# Patient Record
Sex: MALE
[De-identification: names, ages, dates, MRNs, and addresses within clinical notes are randomized per-mention and may not be internally consistent; named-entity substitution may affect disease eponyms.]

---

## 2017-04-22 PROBLEM — Z00.00 ENCOUNTER FOR PREVENTIVE HEALTH EXAMINATION: Status: ACTIVE | Noted: 2017-04-22

## 2022-05-26 ENCOUNTER — APPOINTMENT (OUTPATIENT)
Dept: UROLOGY | Facility: CLINIC | Age: 72
End: 2022-05-26
Payer: MEDICARE

## 2022-05-26 VITALS
HEART RATE: 84 BPM | OXYGEN SATURATION: 98 % | WEIGHT: 180 LBS | DIASTOLIC BLOOD PRESSURE: 85 MMHG | HEIGHT: 69 IN | BODY MASS INDEX: 26.66 KG/M2 | TEMPERATURE: 97 F | SYSTOLIC BLOOD PRESSURE: 161 MMHG

## 2022-05-26 DIAGNOSIS — Z86.39 PERSONAL HISTORY OF OTHER ENDOCRINE, NUTRITIONAL AND METABOLIC DISEASE: ICD-10-CM

## 2022-05-26 DIAGNOSIS — Z85.46 PERSONAL HISTORY OF MALIGNANT NEOPLASM OF PROSTATE: ICD-10-CM

## 2022-05-26 PROCEDURE — 99205 OFFICE O/P NEW HI 60 MIN: CPT

## 2022-05-26 RX ORDER — ATORVASTATIN CALCIUM 80 MG/1
TABLET, FILM COATED ORAL
Refills: 0 | Status: ACTIVE | COMMUNITY

## 2022-05-26 RX ORDER — METFORMIN HYDROCHLORIDE 625 MG/1
TABLET ORAL
Refills: 0 | Status: ACTIVE | COMMUNITY

## 2022-05-26 RX ORDER — LISINOPRIL 10 MG/1
10 TABLET ORAL
Refills: 0 | Status: ACTIVE | COMMUNITY

## 2022-05-26 NOTE — ASSESSMENT
[FreeTextEntry1] : Diagnosis: Prostate Cancer\par \par Plan:\par Prostate MRI then follow up after for discussion of surgery\par Referral to Dr. Jones to discuss umbilical hernia repair same time as radical prostatectomy\par \par Today we discussed the natural history of localized prostate cancer, and the heterogeneous biology of this malignancy. We discussed the fact that many prostate cancers are slow growing and unlikely to metastasize or cause death, whereas others can be life threatening. We reviewed risk stratification, staging, [place] scoring, and PSA levels as they pertain to risk. \par \par All treatment options were reviewed. This included active surveillance, androgen deprivation, emerging options such as focal therapy/HIFU/cryotherapy, radiation options (including IMRT, SBRT, brachytherapy) and surgical options (open vs. robotic surgery, nerve vs. non-nerve sparing). Oncologic outcomes were compared and contrasted. Risks of biochemical and clinical recurrence discussed. Risks of needing adjuvant or salvage treatments reviewed. We discussed the risks of secondary malignancy after radiation. We discussed the opportunity for pathological staging with surgery vs. other options. We discussed the possibility of positive margins, treatment failure, cancer recurrence and cancer-related death even with treatment. \par \par All potential side effects of treatment were reviewed including, but not limited to: short term or permanent stress urinary incontinence and/or short term or permanent erectile dysfunction, penile shortening, rectal symptoms/pain, perineal pain, and other side effects. \par \par All potential complications of treatment and surgery were reviewed including, but not limited to: risks of conversion from MIS to open surgery discussed, bleeding//life-threatening hemorrhage, rectal injury requiring colostomy or delayed recognition leading to fistula, vascular/bowel/adjacent visceral organ injury, trocar/access injury, the possibility of recognized vs. unrecognized/delayed-recognition injury, risks of thermal/blunt/sharp/retraction injury, risks of DVT, PE, MI, death, risks of cardiopulmonary/anesthesia related complications, positional injury, infection/collection/abscess, wound complications/dehiscence/seroma/cellulitis, urinoma/fistula, ureteral injury/obstruction, bladder neck contracture, penile shortening, meatal stenosis, urethral stricture, lymphocele, obturator nerve injury, prolonged anastomotic leak, and other complications. \par \par Plan: \par I will have him see GS for hernia concurrent with prostatectomy as he has elected surgical approach for cancer. \par \par Kirt Noel MD, FRCS \par  of Urology [facility]\par Director of Laparoscopic and Robotic Surgery \par [facility] Director of Urology, [facility] \par Professor of Urology\par \par (Office) [phone]\par (Cell)  [phone] \par [email or web address]\par \par \par \par \par

## 2022-05-26 NOTE — PHYSICAL EXAM
[General Appearance - Well Developed] : well developed [General Appearance - Well Nourished] : well nourished [Normal Appearance] : normal appearance [Well Groomed] : well groomed [General Appearance - In No Acute Distress] : no acute distress [Edema] : no peripheral edema [Respiration, Rhythm And Depth] : normal respiratory rhythm and effort [Exaggerated Use Of Accessory Muscles For Inspiration] : no accessory muscle use [Abdomen Soft] : soft [Abdomen Tenderness] : non-tender [Costovertebral Angle Tenderness] : no ~M costovertebral angle tenderness [Urethral Meatus] : meatus normal [Penis Abnormality] : normal circumcised penis [Urinary Bladder Findings] : the bladder was normal on palpation [Scrotum] : the scrotum was normal [Testes Mass (___cm)] : there were no testicular masses [Prostate Tenderness] : the prostate was not tender [No Prostate Nodules] : no prostate nodules [Prostate Size ___ gm] : prostate size [unfilled] gm [Normal Station and Gait] : the gait and station were normal for the patient's age [] : no rash [No Focal Deficits] : no focal deficits [Oriented To Time, Place, And Person] : oriented to person, place, and time [Affect] : the affect was normal [Mood] : the mood was normal [Not Anxious] : not anxious [No Palpable Adenopathy] : no palpable adenopathy [FreeTextEntry1] : umbilical hernia

## 2022-05-26 NOTE — HISTORY OF PRESENT ILLNESS
[FreeTextEntry1] : Dr. Mir Robert\par [address] [address]\par \par \par This is a 72 year old male who presents today for elevated PSA.  He has pMHx significant for HTN, HLD, Diabetes (A1c 5.9).\par \par PSA 11.52\par \par He underwent prostate biopsy on 4/29/22 with Dr. Leonidas Carpenter.  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

## 2022-06-17 ENCOUNTER — APPOINTMENT (OUTPATIENT)
Dept: SURGERY | Facility: CLINIC | Age: 72
End: 2022-06-17

## 2022-06-17 VITALS
DIASTOLIC BLOOD PRESSURE: 94 MMHG | OXYGEN SATURATION: 97 % | BODY MASS INDEX: 26.51 KG/M2 | TEMPERATURE: 97.3 F | WEIGHT: 179 LBS | HEIGHT: 69 IN | SYSTOLIC BLOOD PRESSURE: 154 MMHG | HEART RATE: 86 BPM

## 2022-06-17 DIAGNOSIS — E78.00 PURE HYPERCHOLESTEROLEMIA, UNSPECIFIED: ICD-10-CM

## 2022-06-17 DIAGNOSIS — I10 ESSENTIAL (PRIMARY) HYPERTENSION: ICD-10-CM

## 2022-06-17 DIAGNOSIS — K42.9 UMBILICAL HERNIA W/OUT OBSTRUCTION OR GANGRENE: ICD-10-CM

## 2022-06-17 PROCEDURE — 99203 OFFICE O/P NEW LOW 30 MIN: CPT

## 2022-06-21 ENCOUNTER — APPOINTMENT (OUTPATIENT)
Dept: UROLOGY | Facility: CLINIC | Age: 72
End: 2022-06-21
Payer: MEDICARE

## 2022-06-21 VITALS — SYSTOLIC BLOOD PRESSURE: 163 MMHG | DIASTOLIC BLOOD PRESSURE: 88 MMHG | HEART RATE: 88 BPM | TEMPERATURE: 98 F

## 2022-06-21 PROCEDURE — 99215 OFFICE O/P EST HI 40 MIN: CPT

## 2022-06-21 NOTE — HISTORY OF PRESENT ILLNESS
[FreeTextEntry1] : Dr. 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

## 2022-06-21 NOTE — ASSESSMENT
[FreeTextEntry1] : Diagnosis: prostate cancer and umbilical hernia\par \par PLAN\par \par Today we discussed the natural history of localized prostate cancer, and the heterogeneous biology of this malignancy.  We discussed the fact that many prostate cancers are slow growing and unlikely to metastasize or cause death, whereas others can be life threatening.  We reviewed risk stratification, staging, [place] scoring, and PSA levels as they pertain to risk.  \par \par All treatment options were reviewed.  This included active surveillance, androgen deprivation, emerging options such as focal therapy/HIFU/cryotherapy, radiation options (including IMRT, SBRT, brachytherapy) and surgical options (open vs. robotic surgery, nerve vs. non-nerve sparing).  Oncologic outcomes were compared and contrasted.  Risks of biochemical and clinical recurrence discussed.  Risks of needing adjuvant or salvage treatments reviewed.  We discussed the risks of secondary malignancy after radiation.  We discussed the opportunity for pathological staging with surgery vs. other options.  We discussed the possibility of positive margins, treatment failure, cancer recurrence and cancer-related death even with treatment. \par \par All potential side effects of treatment were reviewed including, but not limited to: short term or permanent stress urinary incontinence and/or short term or permanent erectile dysfunction, penile shortening, rectal symptoms/pain, perineal pain, and other side effects. \par \par All potential complications of treatment and surgery were reviewed including, but not limited to: risks of conversion from MIS to open surgery discussed, bleeding//life-threatening hemorrhage, rectal injury requiring colostomy or delayed recognition leading to fistula, vascular/bowel/adjacent visceral organ injury, trocar/access injury, the possibility of recognized vs. unrecognized/delayed-recognition injury, risks of thermal/blunt/sharp/retraction injury, risks of DVT, PE, MI, death, risks of cardiopulmonary/anesthesia related complications, positional injury, infection/collection/abscess, wound complications/dehiscence/seroma/cellulitis, urinoma/fistula, ureteral injury/obstruction, bladder neck contracture, penile shortening, meatal stenosis, urethral stricture, lymphocele, obturator nerve injury, prolonged anastomotic leak, and other complications. \par \par Patient elects single port RALP, with hernia same anesthetic with Dr. Jones.\par \par Krit Noel MD, [facility] \par  of Urology [facility]\par Director of Laparoscopic and Robotic Surgery \par [place] Director of Urology, [facility] \par Professor of Urology\par \par (Office) \par (Cell)  [phone] \par [email or web address]\par \par \par \par \par \par \par \par \par

## 2022-06-23 PROBLEM — K42.9 UMBILICAL HERNIA: Status: ACTIVE | Noted: 2022-06-17

## 2022-07-06 ENCOUNTER — RESULT REVIEW (OUTPATIENT)
Age: 72
End: 2022-07-06

## 2022-07-06 ENCOUNTER — OUTPATIENT (OUTPATIENT)
Dept: OUTPATIENT SERVICES | Facility: HOSPITAL | Age: 72
LOS: 1 days | End: 2022-07-06
Payer: MEDICARE

## 2022-07-06 DIAGNOSIS — C61 MALIGNANT NEOPLASM OF PROSTATE: ICD-10-CM

## 2022-07-06 LAB — SURGICAL PATHOLOGY STUDY: SIGNIFICANT CHANGE UP

## 2022-07-06 PROCEDURE — 88321 CONSLTJ&REPRT SLD PREP ELSWR: CPT

## 2022-07-26 NOTE — ASSESSMENT
[FreeTextEntry1] : 73 y/o male with umbilical/ventral hernia. Wishes to have this repaired. \par \par We discussed the risk,benefits and alternatives to  OPEN umbilical/ventral hernia repair with possible mesh in detail including but not limited to bleeding, infection, death, disability, blood clots, cardiac, pulmonary, neurological problems, prolonged hospital course, need for additional procedures, need for implants, recurrence of the hernia, displeasure with cosmetic outcome and other issues. Patient expressed understanding and wishes to proceed with surgery. We discussed plan for combined procedure with . Will coordinate with his office.All questions were answered. Notably greater than 50% of today's 30 minute initial visit was spent on counseling and coordination of patients care. \par \par Plan:\par -Plan for OPEN umbilical/ventral hernia repair with possible mesh\par -Combine procedure with mayte Nicole coordinate.

## 2022-07-26 NOTE — HISTORY OF PRESENT ILLNESS
[de-identified] : This is a 71 y/o male presenting to the office for evaluation and management of a umbilical/ventral hernia. Referred by  for potential hernia repair concurrent with prostatectomy. He denies any pain or discomfort at site of hernia. Underwent a CT scan of the abdomen/pelvis (5/23/22) in which he was diagnosed with an umbilical hernia. Denies any issues with bowels.

## 2022-07-26 NOTE — PHYSICAL EXAM
[Oriented to Person] : oriented to person [Oriented to Place] : oriented to place [Oriented to Time] : oriented to time [Calm] : calm [Abdominal Masses] : No abdominal masses [Abdomen Tenderness] : ~T ~M No abdominal tenderness [Tender] : was nontender [Enlarged] : not enlarged [de-identified] : NAD, comfortable [de-identified] : Normocephalic, atraumatic. No scleral icterus.  [de-identified] : Supple, no JVD or cervical lymphadenopathy.  [de-identified] : No respiratory distress.  [de-identified] : +BS soft, nontender, nondistended. There is an umbilical/ventral hernia, reducible and nontender. \par

## 2022-07-26 NOTE — DATA REVIEWED
[FreeTextEntry1] : 5/23/2022 EXAM:  CT ABDOMEN AND PELVIS WITHOUT AND WITH CONTRAST\par \par Note - This patient has received 0 CT studies and 0 Myocardial Perfusion studies within our network over the previous 12 month period.\par HISTORY:  Malignant neoplasm of the prostate \par TECHNIQUE: CT of the abdomen and pelvis is performed.\par Contrast Technique: Without and With \par IV Contrast: Omnipaque 350 75 mL from a 100 mL vial\par Oral Contrast: No\par Range/Planes: Domes of the diaphragm to the pubic symphysis. Axial, coronal, and sagittal. \par One or more of the following dose reduction techniques were used: automated exposure control, adjustment of the mA and/or kV according to patient size, use of iterative reconstruction technique. \par COMPARISON:  None  \par \par FINDINGS: VISUALIZED PORTION OF CHEST\par LUNGS: Mild lower lobe diffuse bronchiectasis.\par HEART: Unremarkable.\par \par FINDINGS: ABDOMEN/PELVIS\par \par LIVER: Scattered cysts, measuring up to 1.3 cm and the left lobe. Additional subcentimeter hypodense lesions, likely cysts but too small to characterize.\par BILIARY: Unremarkable.      \par PANCREAS: \par - Mild dilatation of the duct in the head, measuring 0.5 cm. No discrete mass identified.\par SPLEEN: Unremarkable.\par ADRENAL GLANDS: Unremarkable.\par KIDNEYS: \par -0.3 cm left midpole calcification may represent a calculus. There is a 1.3 cm left lower pole simple cyst. Additional subcentimeter hypodense lesions, likely representing cysts..\par URETERS: Unremarkable.\par URINARY BLADDER: Unremarkable.\par \par REPRODUCTIVE ORGANS: Prostate measures 4.4 cm in transverse dimension.\par \par STOMACH: Unremarkable.\par SMALL BOWEL: Unremarkable.\par \par LARGE BOWEL: The rectum is distended with stool. Scattered colonic diverticula.\par \par PERITONEUM: Unremarkable.\par LYMPH NODES: Unremarkable.\par VASCULATURE: Normal caliber abdominal aorta with extensive atherosclerotic calcification.\par \par SOFT TISSUES: Umbilical hernia containing nonobstructed loops of small bowel.\par \par BONES: No aggressive osseous lesion.\par \par IMPRESSION:  \par - No evidence of metastatic disease in the abdomen or pelvis.\par - Dilatation of the pancreatic duct in the head measuring 0.5 cm. No discrete mass identified. Recommend MRI/MRCP for further evaluation.\par - Extensive arterial vascular calcification.\par - Umbilical hernia containing nonobstructed loops of small bowel.

## 2022-08-17 ENCOUNTER — NON-APPOINTMENT (OUTPATIENT)
Age: 72
End: 2022-08-17

## 2023-01-09 ENCOUNTER — APPOINTMENT (OUTPATIENT)
Dept: UROLOGY | Facility: HOSPITAL | Age: 73
End: 2023-01-09